# Patient Record
Sex: FEMALE | URBAN - METROPOLITAN AREA
[De-identification: names, ages, dates, MRNs, and addresses within clinical notes are randomized per-mention and may not be internally consistent; named-entity substitution may affect disease eponyms.]

---

## 2022-06-21 ENCOUNTER — OFFICE VISIT (OUTPATIENT)
Dept: URBAN - METROPOLITAN AREA CLINIC 50 | Facility: CLINIC | Age: 72
End: 2022-06-21
Payer: MEDICARE

## 2022-06-21 ENCOUNTER — WEB ENCOUNTER (OUTPATIENT)
Dept: URBAN - METROPOLITAN AREA CLINIC 50 | Facility: CLINIC | Age: 72
End: 2022-06-21

## 2022-06-21 VITALS
DIASTOLIC BLOOD PRESSURE: 83 MMHG | HEART RATE: 67 BPM | WEIGHT: 120 LBS | BODY MASS INDEX: 23.56 KG/M2 | HEIGHT: 60 IN | SYSTOLIC BLOOD PRESSURE: 113 MMHG | TEMPERATURE: 98.1 F

## 2022-06-21 DIAGNOSIS — R19.4 CHANGE IN BOWEL HABIT: ICD-10-CM

## 2022-06-21 DIAGNOSIS — R10.84 ABDOMINAL CRAMPING, GENERALIZED: ICD-10-CM

## 2022-06-21 DIAGNOSIS — R19.5 LOOSE STOOLS: ICD-10-CM

## 2022-06-21 PROCEDURE — 99244 OFF/OP CNSLTJ NEW/EST MOD 40: CPT | Performed by: INTERNAL MEDICINE

## 2022-06-21 PROCEDURE — 99204 OFFICE O/P NEW MOD 45 MIN: CPT | Performed by: INTERNAL MEDICINE

## 2022-06-21 RX ORDER — SODIUM PICOSULFATE, MAGNESIUM OXIDE, AND ANHYDROUS CITRIC ACID 10; 3.5; 12 MG/160ML; G/160ML; G/160ML
160 ML LIQUID ORAL AS DIRECTED
Qty: 1 KIT | Refills: 0 | OUTPATIENT
Start: 2022-06-21 | End: 2022-06-23

## 2022-06-21 NOTE — HPI-TODAY'S VISIT:
referred by Dr. Hill Barron for diarrhea copy of note sent to referring MD trying to switch practices, currently seeing Dr. Casanova hx of cdiff here for 2 things has diarrhea for 3 months. last month currently on colestipol  felt weak, lost 8 lbs. is not eating. watery stools fecal incontnence x 2. hx of cdiff, 15 years ago, one episode did a stool sample, told she did not have cdiff went to internist and had recent labs and it was normal is a borderline DM. last colonosocpy 2 years, 2 large polyps. scheduled in july. currently on olmesartan/hct x 1 year

## 2022-07-11 ENCOUNTER — TELEPHONE ENCOUNTER (OUTPATIENT)
Dept: URBAN - METROPOLITAN AREA CLINIC 92 | Facility: CLINIC | Age: 72
End: 2022-07-11

## 2022-07-11 ENCOUNTER — OFFICE VISIT (OUTPATIENT)
Dept: URBAN - METROPOLITAN AREA TELEHEALTH 2 | Facility: TELEHEALTH | Age: 72
End: 2022-07-11
Payer: MEDICARE

## 2022-07-11 VITALS — WEIGHT: 120 LBS | BODY MASS INDEX: 23.56 KG/M2 | HEIGHT: 60 IN

## 2022-07-11 DIAGNOSIS — R10.84 ABDOMINAL CRAMPING, GENERALIZED: ICD-10-CM

## 2022-07-11 DIAGNOSIS — R19.4 CHANGE IN BOWEL HABIT: ICD-10-CM

## 2022-07-11 PROBLEM — 129851009: Status: ACTIVE | Noted: 2022-07-10

## 2022-07-11 PROCEDURE — 99213 OFFICE O/P EST LOW 20 MIN: CPT | Performed by: INTERNAL MEDICINE

## 2022-07-11 NOTE — HPI-TODAY'S VISIT:
the day of her last visit, she had diarrhea x 2. stopped her olmesartan on her own. her physiologist (son)put her on a protein/glutamine. feeling overall better. stools are better since stopping olemsartan and adding all these supplements. weakness is slightly better    ============================ referred by Dr. Hill Barron for diarrhea copy of note sent to referring MD trying to switch practices, currently seeing Dr. Casanova hx of donna here for 2 things has diarrhea for 3 months. last month currently on colestipol  felt weak, lost 8 lbs. is not eating. watery stools fecal incontnence x 2. hx of donna, 15 years ago, one episode did a stool sample, told she did not have cdiff went to internist and had recent labs and it was normal is a borderline DM. last colonosocpy 2 years, 2 large polyps. scheduled in july. currently on olmesartan/hct x 1 year

## 2022-07-13 ENCOUNTER — OFFICE VISIT (OUTPATIENT)
Dept: URBAN - METROPOLITAN AREA SURGERY CENTER 18 | Facility: SURGERY CENTER | Age: 72
End: 2022-07-13

## 2022-07-15 ENCOUNTER — TELEPHONE ENCOUNTER (OUTPATIENT)
Dept: URBAN - METROPOLITAN AREA CLINIC 98 | Facility: CLINIC | Age: 72
End: 2022-07-15

## 2022-07-15 RX ORDER — OMEPRAZOLE 40 MG/1
1 CAPSULE 30 MINUTES BEFORE MORNING MEAL CAPSULE, DELAYED RELEASE ORAL ONCE A DAY
Qty: 30 | OUTPATIENT
Start: 2022-07-19

## 2022-07-27 ENCOUNTER — CLAIMS CREATED FROM THE CLAIM WINDOW (OUTPATIENT)
Dept: URBAN - METROPOLITAN AREA CLINIC 4 | Facility: CLINIC | Age: 72
End: 2022-07-27
Payer: MEDICARE

## 2022-07-27 ENCOUNTER — OFFICE VISIT (OUTPATIENT)
Dept: URBAN - METROPOLITAN AREA SURGERY CENTER 18 | Facility: SURGERY CENTER | Age: 72
End: 2022-07-27
Payer: MEDICARE

## 2022-07-27 ENCOUNTER — TELEPHONE ENCOUNTER (OUTPATIENT)
Dept: URBAN - METROPOLITAN AREA CLINIC 96 | Facility: CLINIC | Age: 72
End: 2022-07-27

## 2022-07-27 DIAGNOSIS — K63.89 OTHER SPECIFIED DISEASES OF INTESTINE: ICD-10-CM

## 2022-07-27 DIAGNOSIS — R19.7 ACUTE DIARRHEA: ICD-10-CM

## 2022-07-27 PROCEDURE — G8907 PT DOC NO EVENTS ON DISCHARG: HCPCS | Performed by: INTERNAL MEDICINE

## 2022-07-27 PROCEDURE — 88305 TISSUE EXAM BY PATHOLOGIST: CPT | Performed by: PATHOLOGY

## 2022-07-27 PROCEDURE — 45380 COLONOSCOPY AND BIOPSY: CPT | Performed by: INTERNAL MEDICINE

## 2022-07-27 RX ORDER — OMEPRAZOLE 40 MG/1
1 CAPSULE 30 MINUTES BEFORE MORNING MEAL CAPSULE, DELAYED RELEASE ORAL ONCE A DAY
Qty: 30 | Status: ACTIVE | COMMUNITY
Start: 2022-07-19

## 2022-08-30 ENCOUNTER — TELEPHONE ENCOUNTER (OUTPATIENT)
Dept: URBAN - METROPOLITAN AREA CLINIC 6 | Facility: CLINIC | Age: 72
End: 2022-08-30

## 2022-09-01 ENCOUNTER — OFFICE VISIT (OUTPATIENT)
Dept: URBAN - METROPOLITAN AREA TELEHEALTH 2 | Facility: TELEHEALTH | Age: 72
End: 2022-09-01
Payer: MEDICARE

## 2022-09-01 ENCOUNTER — TELEPHONE ENCOUNTER (OUTPATIENT)
Dept: URBAN - METROPOLITAN AREA CLINIC 92 | Facility: CLINIC | Age: 72
End: 2022-09-01

## 2022-09-01 ENCOUNTER — TELEPHONE ENCOUNTER (OUTPATIENT)
Dept: URBAN - METROPOLITAN AREA CLINIC 96 | Facility: CLINIC | Age: 72
End: 2022-09-01

## 2022-09-01 VITALS — BODY MASS INDEX: 23.56 KG/M2 | HEIGHT: 60 IN | WEIGHT: 120 LBS

## 2022-09-01 DIAGNOSIS — R19.7 DIARRHEA, UNSPECIFIED TYPE: ICD-10-CM

## 2022-09-01 DIAGNOSIS — K21.9 GASTROESOPHAGEAL REFLUX DISEASE, UNSPECIFIED WHETHER ESOPHAGITIS PRESENT: ICD-10-CM

## 2022-09-01 DIAGNOSIS — R63.4 WEIGHT LOSS: ICD-10-CM

## 2022-09-01 DIAGNOSIS — R11.0 NAUSEA: ICD-10-CM

## 2022-09-01 PROCEDURE — 99213 OFFICE O/P EST LOW 20 MIN: CPT | Performed by: INTERNAL MEDICINE

## 2022-09-01 RX ORDER — OMEPRAZOLE 40 MG/1
1 CAPSULE 30 MINUTES BEFORE MORNING MEAL CAPSULE, DELAYED RELEASE ORAL ONCE A DAY
Qty: 30 | Status: ACTIVE | COMMUNITY
Start: 2022-07-19

## 2022-09-01 NOTE — HPI-TODAY'S VISIT:
s/p colon in 7/2022 with normal TI and colon bx negative for microscopic colitis.  still w/ diarrhea episodes, had to leave [facility] on sunday for this meclizine now has constiapted her. has had vertigo before but this time this is not vertigo.  taking meclizine for nausea. off the olemsartan since our visit. hasnot gone back to see pcp re her BP. still w/ GB eating causea nausea/reflux/diarrhea so she does not eat much  medications: meclizine, escitalopram, 1/2 ambien qhs  ========================= the day of her last visit, she had diarrhea x 2. stopped her olmesartan on her own. her physiologist (son)put her on a protein/glutamine. feeling overall better. stools are better since stopping olemsartan and adding all these supplements. weakness is slightly better    ============================ referred by Dr. Hill Barron for diarrhea copy of note sent to referring MD trying to switch practices, currently seeing Dr. Casanova hx of cdiff here for 2 things has diarrhea for 3 months. last month currently on colestipol  felt weak, lost 8 lbs. is not eating. watery stools fecal incontnence x 2. hx of cdiff, 15 years ago, one episode did a stool sample, told she did not have cdiff went to internist and had recent labs and it was normal is a borderline DM. last colonosocpy 2 years, 2 large polyps. scheduled in july. currently on olmesartan/hct x 1 year referred by Dr. Hill Barron for diarrhea copy of note sent to referring MD trying to switch practices, currently seeing Dr. Jocelyne avila of cdiff here for 2 things has diarrhea for 3 months. last month currently on colestipol  felt weak, lost 8 lbs. is not eating. watery stools fecal incontnence x 2. hx of cdiff, 15 years ago, one episode did a stool sample, told she did not have cdiff went to internist and had recent labs and it was normal is a borderline DM. last colonosocpy 2 years, 2 large polyps. scheduled in july. currently on olmesartan/hct x 1 year

## 2022-09-12 ENCOUNTER — OFFICE VISIT (OUTPATIENT)
Dept: URBAN - METROPOLITAN AREA CLINIC 95 | Facility: CLINIC | Age: 72
End: 2022-09-12

## 2022-09-16 ENCOUNTER — TELEPHONE ENCOUNTER (OUTPATIENT)
Dept: URBAN - METROPOLITAN AREA CLINIC 6 | Facility: CLINIC | Age: 72
End: 2022-09-16

## 2022-09-26 ENCOUNTER — OFFICE VISIT (OUTPATIENT)
Dept: URBAN - METROPOLITAN AREA CLINIC 95 | Facility: CLINIC | Age: 72
End: 2022-09-26

## 2022-09-29 ENCOUNTER — CLAIMS CREATED FROM THE CLAIM WINDOW (OUTPATIENT)
Dept: URBAN - METROPOLITAN AREA CLINIC 95 | Facility: CLINIC | Age: 72
End: 2022-09-29

## 2022-09-29 ENCOUNTER — CLAIMS CREATED FROM THE CLAIM WINDOW (OUTPATIENT)
Dept: URBAN - METROPOLITAN AREA CLINIC 95 | Facility: CLINIC | Age: 72
End: 2022-09-29
Payer: MEDICARE

## 2022-09-29 ENCOUNTER — OFFICE VISIT (OUTPATIENT)
Dept: URBAN - METROPOLITAN AREA CLINIC 95 | Facility: CLINIC | Age: 72
End: 2022-09-29

## 2022-09-29 DIAGNOSIS — R11.0 NAUSEA: ICD-10-CM

## 2022-09-29 PROCEDURE — 76700 US EXAM ABDOM COMPLETE: CPT | Performed by: INTERNAL MEDICINE

## 2022-09-29 RX ORDER — OMEPRAZOLE 40 MG/1
1 CAPSULE 30 MINUTES BEFORE MORNING MEAL CAPSULE, DELAYED RELEASE ORAL ONCE A DAY
Qty: 30 | Status: ACTIVE | COMMUNITY
Start: 2022-07-19

## 2022-10-03 ENCOUNTER — OFFICE VISIT (OUTPATIENT)
Dept: URBAN - METROPOLITAN AREA SURGERY CENTER 18 | Facility: SURGERY CENTER | Age: 72
End: 2022-10-03

## 2022-11-02 ENCOUNTER — DASHBOARD ENCOUNTERS (OUTPATIENT)
Age: 72
End: 2022-11-02

## 2022-11-04 ENCOUNTER — TELEPHONE ENCOUNTER (OUTPATIENT)
Dept: URBAN - METROPOLITAN AREA CLINIC 92 | Facility: CLINIC | Age: 72
End: 2022-11-04

## 2022-11-04 ENCOUNTER — OFFICE VISIT (OUTPATIENT)
Dept: URBAN - METROPOLITAN AREA TELEHEALTH 2 | Facility: TELEHEALTH | Age: 72
End: 2022-11-04
Payer: MEDICARE

## 2022-11-04 VITALS — WEIGHT: 120 LBS | BODY MASS INDEX: 23.56 KG/M2 | HEIGHT: 60 IN

## 2022-11-04 DIAGNOSIS — R19.7 DIARRHEA, UNSPECIFIED TYPE: ICD-10-CM

## 2022-11-04 DIAGNOSIS — K21.9 GASTROESOPHAGEAL REFLUX DISEASE, UNSPECIFIED WHETHER ESOPHAGITIS PRESENT: ICD-10-CM

## 2022-11-04 DIAGNOSIS — R63.4 WEIGHT LOSS: ICD-10-CM

## 2022-11-04 DIAGNOSIS — R11.0 NAUSEA: ICD-10-CM

## 2022-11-04 PROBLEM — 235595009: Status: ACTIVE | Noted: 2022-07-18

## 2022-11-04 PROCEDURE — 99213 OFFICE O/P EST LOW 20 MIN: CPT | Performed by: INTERNAL MEDICINE

## 2022-11-04 RX ORDER — FAMOTIDINE 40 MG/1
1 TABLET AT BEDTIME TABLET, FILM COATED ORAL ONCE A DAY
Qty: 30 | Refills: 3 | OUTPATIENT

## 2022-11-04 RX ORDER — OMEPRAZOLE 40 MG/1
1 CAPSULE 30 MINUTES BEFORE MORNING MEAL CAPSULE, DELAYED RELEASE ORAL ONCE A DAY
Qty: 30 | Refills: 3

## 2022-11-04 RX ORDER — OMEPRAZOLE 40 MG/1
1 CAPSULE 30 MINUTES BEFORE MORNING MEAL CAPSULE, DELAYED RELEASE ORAL ONCE A DAY
Qty: 30 | Status: ACTIVE | COMMUNITY
Start: 2022-07-19

## 2022-11-04 NOTE — HPI-TODAY'S VISIT:
went out 2 weeks ago, had a amina, severe burning and indigestion. wonders if she has an ulcer  complete abd u/s--> normal took famotidine 40 mg, omeprazole, helped  ============================= s/p colon in 7/2022 with normal TI and colon bx negative for microscopic colitis.  still w/ diarrhea episodes, had to leave [facility] on sunday for this meclizine now has constiapted her. has had vertigo before but this time this is not vertigo.  taking meclizine for nausea. off the olemsartan since our visit. hasnot gone back to see pcp re her BP. still w/ GB eating causea nausea/reflux/diarrhea so she does not eat much  medications: meclizine, escitalopram, 1/2 ambien qhs  ========================= the day of her last visit, she had diarrhea x 2. stopped her olmesartan on her own. her physiologist (son)put her on a protein/glutamine. feeling overall better. stools are better since stopping olemsartan and adding all these supplements. weakness is slightly better    ============================ referred by Dr. Hill Barron for diarrhea copy of note sent to referring MD trying to switch practices, currently seeing Dr. Casanova hx of cdiff here for 2 things has diarrhea for 3 months. last month currently on colestipol  felt weak, lost 8 lbs. is not eating. watery stools fecal incontnence x 2. hx of cdiff, 15 years ago, one episode did a stool sample, told she did not have cdiff went to internist and had recent labs and it was normal is a borderline DM. last colonosocpy 2 years, 2 large polyps. scheduled in july. currently on olmesartan/hct x 1 year referred by Dr. Hill Barron for diarrhea copy of note sent to referring MD trying to switch practices, currently seeing Dr. Casanova hx of cdiff here for 2 things has diarrhea for 3 months. last month currently on colestipol  felt weak, lost 8 lbs. is not eating. watery stools fecal incontnence x 2. hx of cdiff, 15 years ago, one episode did a stool sample, told she did not have cdiff went to internist and had recent labs and it was normal is a borderline DM. last colonosocpy 2 years, 2 large polyps. scheduled in july. currently on olmesartan/hct x 1 year

## 2022-12-05 ENCOUNTER — OFFICE VISIT (OUTPATIENT)
Dept: URBAN - METROPOLITAN AREA SURGERY CENTER 18 | Facility: SURGERY CENTER | Age: 72
End: 2022-12-05

## 2023-01-18 ENCOUNTER — OFFICE VISIT (OUTPATIENT)
Dept: URBAN - METROPOLITAN AREA SURGERY CENTER 18 | Facility: SURGERY CENTER | Age: 73
End: 2023-01-18